# Patient Record
Sex: MALE | Race: WHITE | NOT HISPANIC OR LATINO | Employment: PART TIME | URBAN - METROPOLITAN AREA
[De-identification: names, ages, dates, MRNs, and addresses within clinical notes are randomized per-mention and may not be internally consistent; named-entity substitution may affect disease eponyms.]

---

## 2023-03-13 LAB
ALANINE AMINOTRANSFERASE (SGPT) (U/L) IN SER/PLAS: 18 U/L (ref 10–52)
ALBUMIN (G/DL) IN SER/PLAS: 4.4 G/DL (ref 3.4–5)
ALKALINE PHOSPHATASE (U/L) IN SER/PLAS: 74 U/L (ref 33–120)
ANION GAP IN SER/PLAS: 11 MMOL/L (ref 10–20)
ASPARTATE AMINOTRANSFERASE (SGOT) (U/L) IN SER/PLAS: 17 U/L (ref 9–39)
BILIRUBIN TOTAL (MG/DL) IN SER/PLAS: 0.6 MG/DL (ref 0–1.2)
CALCIDIOL (25 OH VITAMIN D3) (NG/ML) IN SER/PLAS: 68 NG/ML
CALCIUM (MG/DL) IN SER/PLAS: 9.5 MG/DL (ref 8.6–10.6)
CARBON DIOXIDE, TOTAL (MMOL/L) IN SER/PLAS: 29 MMOL/L (ref 21–32)
CHLORIDE (MMOL/L) IN SER/PLAS: 104 MMOL/L (ref 98–107)
CHOLESTEROL (MG/DL) IN SER/PLAS: 165 MG/DL (ref 0–199)
CHOLESTEROL IN HDL (MG/DL) IN SER/PLAS: 44.5 MG/DL
CHOLESTEROL/HDL RATIO: 3.7
CREATININE (MG/DL) IN SER/PLAS: 0.82 MG/DL (ref 0.5–1.3)
ERYTHROCYTE DISTRIBUTION WIDTH (RATIO) BY AUTOMATED COUNT: 12.2 % (ref 11.5–14.5)
ERYTHROCYTE MEAN CORPUSCULAR HEMOGLOBIN CONCENTRATION (G/DL) BY AUTOMATED: 32.7 G/DL (ref 32–36)
ERYTHROCYTE MEAN CORPUSCULAR VOLUME (FL) BY AUTOMATED COUNT: 85 FL (ref 80–100)
ERYTHROCYTES (10*6/UL) IN BLOOD BY AUTOMATED COUNT: 5.38 X10E12/L (ref 4.5–5.9)
FERRITIN (UG/LL) IN SER/PLAS: 142 UG/L (ref 20–300)
GFR MALE: >90 ML/MIN/1.73M2
GLUCOSE (MG/DL) IN SER/PLAS: 94 MG/DL (ref 74–99)
HEMATOCRIT (%) IN BLOOD BY AUTOMATED COUNT: 45.9 % (ref 41–52)
HEMOGLOBIN (G/DL) IN BLOOD: 15 G/DL (ref 13.5–17.5)
IRON (UG/DL) IN SER/PLAS: 84 UG/DL (ref 35–150)
IRON BINDING CAPACITY (UG/DL) IN SER/PLAS: 377 UG/DL (ref 240–445)
IRON SATURATION (%) IN SER/PLAS: 22 % (ref 25–45)
LDL: 92 MG/DL (ref 0–99)
LEUKOCYTES (10*3/UL) IN BLOOD BY AUTOMATED COUNT: 5.5 X10E9/L (ref 4.4–11.3)
NRBC (PER 100 WBCS) BY AUTOMATED COUNT: 0 /100 WBC (ref 0–0)
PLATELETS (10*3/UL) IN BLOOD AUTOMATED COUNT: 302 X10E9/L (ref 150–450)
POTASSIUM (MMOL/L) IN SER/PLAS: 4.3 MMOL/L (ref 3.5–5.3)
PROTEIN TOTAL: 6.9 G/DL (ref 6.4–8.2)
SODIUM (MMOL/L) IN SER/PLAS: 140 MMOL/L (ref 136–145)
THYROTROPIN (MIU/L) IN SER/PLAS BY DETECTION LIMIT <= 0.05 MIU/L: 1.6 MIU/L (ref 0.44–3.98)
TRIGLYCERIDE (MG/DL) IN SER/PLAS: 145 MG/DL (ref 0–149)
UREA NITROGEN (MG/DL) IN SER/PLAS: 10 MG/DL (ref 6–23)
VLDL: 29 MG/DL (ref 0–40)

## 2024-03-11 ENCOUNTER — OFFICE VISIT (OUTPATIENT)
Dept: PRIMARY CARE | Facility: CLINIC | Age: 29
End: 2024-03-11
Payer: COMMERCIAL

## 2024-03-11 VITALS
SYSTOLIC BLOOD PRESSURE: 126 MMHG | TEMPERATURE: 98.1 F | DIASTOLIC BLOOD PRESSURE: 85 MMHG | OXYGEN SATURATION: 96 % | WEIGHT: 200 LBS | HEIGHT: 70 IN | BODY MASS INDEX: 28.63 KG/M2 | HEART RATE: 82 BPM

## 2024-03-11 DIAGNOSIS — Z00.00 WELL ADULT EXAM: Primary | ICD-10-CM

## 2024-03-11 PROCEDURE — 1036F TOBACCO NON-USER: CPT | Performed by: INTERNAL MEDICINE

## 2024-03-11 PROCEDURE — 99395 PREV VISIT EST AGE 18-39: CPT | Performed by: INTERNAL MEDICINE

## 2024-03-11 RX ORDER — MULTIVITAMIN
TABLET ORAL
COMMUNITY

## 2024-03-11 RX ORDER — CETIRIZINE HYDROCHLORIDE 10 MG/1
10 TABLET ORAL DAILY
COMMUNITY

## 2024-03-11 ASSESSMENT — PATIENT HEALTH QUESTIONNAIRE - PHQ9
1. LITTLE INTEREST OR PLEASURE IN DOING THINGS: NOT AT ALL
SUM OF ALL RESPONSES TO PHQ9 QUESTIONS 1 & 2: 0
2. FEELING DOWN, DEPRESSED OR HOPELESS: NOT AT ALL

## 2024-03-11 NOTE — PROGRESS NOTES
"Subjective   Patient ID: Kal Esposito is a 28 y.o. male who presents for Annual Exam.    HPI   Here with dad  Overall well without concerns from patient or father  Diet is healthy.  Not much exercise. Walks on occasion.    Review of Systems    Objective   /85   Pulse 82   Temp 36.7 °C (98.1 °F)   Ht 1.778 m (5' 10\")   Wt 90.7 kg (200 lb)   SpO2 96%   BMI 28.70 kg/m²     Physical Exam  Constitutional:       Appearance: Normal appearance.   HENT:      Right Ear: Tympanic membrane and ear canal normal.      Left Ear: Tympanic membrane and ear canal normal.      Mouth/Throat:      Mouth: Mucous membranes are moist.   Eyes:      Extraocular Movements: Extraocular movements intact.      Pupils: Pupils are equal, round, and reactive to light.   Cardiovascular:      Rate and Rhythm: Normal rate and regular rhythm.      Heart sounds: No murmur heard.  Pulmonary:      Effort: Pulmonary effort is normal.      Breath sounds: Normal breath sounds.   Abdominal:      General: Bowel sounds are normal.      Palpations: Abdomen is soft.      Tenderness: There is no abdominal tenderness.   Musculoskeletal:         General: No deformity.      Cervical back: Neck supple.      Right lower leg: No edema.      Left lower leg: No edema.   Skin:     Findings: No lesion or rash.   Neurological:      Mental Status: He is alert.      Cranial Nerves: No cranial nerve deficit.      Motor: No weakness.      Gait: Gait normal.         Assessment/Plan   Problem List Items Addressed This Visit             ICD-10-CM    Well adult exam - Primary Z00.00    Relevant Orders    CBC    Comprehensive Metabolic Panel    Lipid Panel        Health maintenance:  -Labs ordered  -Discussed diet, exercise, and healthy lifestyle  -Tdap 2019     F/u 1 year cpe or prn  "

## 2024-04-19 ENCOUNTER — LAB (OUTPATIENT)
Dept: LAB | Facility: LAB | Age: 29
End: 2024-04-19
Payer: COMMERCIAL

## 2024-04-19 DIAGNOSIS — Z00.00 WELL ADULT EXAM: ICD-10-CM

## 2024-04-19 LAB
ALBUMIN SERPL BCP-MCNC: 4.5 G/DL (ref 3.4–5)
ALP SERPL-CCNC: 79 U/L (ref 33–120)
ALT SERPL W P-5'-P-CCNC: 34 U/L (ref 10–52)
ANION GAP SERPL CALC-SCNC: 13 MMOL/L (ref 10–20)
AST SERPL W P-5'-P-CCNC: 21 U/L (ref 9–39)
BILIRUB SERPL-MCNC: 0.6 MG/DL (ref 0–1.2)
BUN SERPL-MCNC: 12 MG/DL (ref 6–23)
CALCIUM SERPL-MCNC: 9.4 MG/DL (ref 8.6–10.6)
CHLORIDE SERPL-SCNC: 105 MMOL/L (ref 98–107)
CHOLEST SERPL-MCNC: 177 MG/DL (ref 0–199)
CHOLESTEROL/HDL RATIO: 3.6
CO2 SERPL-SCNC: 28 MMOL/L (ref 21–32)
CREAT SERPL-MCNC: 0.8 MG/DL (ref 0.5–1.3)
EGFRCR SERPLBLD CKD-EPI 2021: >90 ML/MIN/1.73M*2
ERYTHROCYTE [DISTWIDTH] IN BLOOD BY AUTOMATED COUNT: 12.7 % (ref 11.5–14.5)
GLUCOSE SERPL-MCNC: 89 MG/DL (ref 74–99)
HCT VFR BLD AUTO: 48.6 % (ref 41–52)
HDLC SERPL-MCNC: 49.6 MG/DL
HGB BLD-MCNC: 15.8 G/DL (ref 13.5–17.5)
LDLC SERPL CALC-MCNC: 100 MG/DL
MCH RBC QN AUTO: 28 PG (ref 26–34)
MCHC RBC AUTO-ENTMCNC: 32.5 G/DL (ref 32–36)
MCV RBC AUTO: 86 FL (ref 80–100)
NON HDL CHOLESTEROL: 127 MG/DL (ref 0–149)
NRBC BLD-RTO: 0 /100 WBCS (ref 0–0)
PLATELET # BLD AUTO: 322 X10*3/UL (ref 150–450)
POTASSIUM SERPL-SCNC: 4.3 MMOL/L (ref 3.5–5.3)
PROT SERPL-MCNC: 7.5 G/DL (ref 6.4–8.2)
RBC # BLD AUTO: 5.64 X10*6/UL (ref 4.5–5.9)
SODIUM SERPL-SCNC: 142 MMOL/L (ref 136–145)
TRIGL SERPL-MCNC: 136 MG/DL (ref 0–149)
VLDL: 27 MG/DL (ref 0–40)
WBC # BLD AUTO: 6.8 X10*3/UL (ref 4.4–11.3)

## 2024-04-19 PROCEDURE — 80053 COMPREHEN METABOLIC PANEL: CPT

## 2024-04-19 PROCEDURE — 36415 COLL VENOUS BLD VENIPUNCTURE: CPT

## 2024-04-19 PROCEDURE — 85027 COMPLETE CBC AUTOMATED: CPT

## 2024-04-19 PROCEDURE — 80061 LIPID PANEL: CPT

## 2024-05-07 ENCOUNTER — TELEPHONE (OUTPATIENT)
Dept: PRIMARY CARE | Facility: CLINIC | Age: 29
End: 2024-05-07
Payer: COMMERCIAL

## 2024-05-10 ENCOUNTER — TELEPHONE (OUTPATIENT)
Dept: PRIMARY CARE | Facility: CLINIC | Age: 29
End: 2024-05-10
Payer: COMMERCIAL

## 2024-05-13 ENCOUNTER — TELEPHONE (OUTPATIENT)
Dept: PRIMARY CARE | Facility: CLINIC | Age: 29
End: 2024-05-13
Payer: COMMERCIAL

## 2024-12-09 ENCOUNTER — OFFICE VISIT (OUTPATIENT)
Dept: PRIMARY CARE | Facility: CLINIC | Age: 29
End: 2024-12-09
Payer: COMMERCIAL

## 2024-12-09 VITALS
SYSTOLIC BLOOD PRESSURE: 137 MMHG | OXYGEN SATURATION: 95 % | BODY MASS INDEX: 29.2 KG/M2 | DIASTOLIC BLOOD PRESSURE: 86 MMHG | HEART RATE: 86 BPM | WEIGHT: 204 LBS | HEIGHT: 70 IN

## 2024-12-09 DIAGNOSIS — K59.09 CHRONIC CONSTIPATION: ICD-10-CM

## 2024-12-09 DIAGNOSIS — M25.50 ARTHRALGIA, UNSPECIFIED JOINT: Primary | ICD-10-CM

## 2024-12-09 PROCEDURE — 1036F TOBACCO NON-USER: CPT | Performed by: INTERNAL MEDICINE

## 2024-12-09 PROCEDURE — 99214 OFFICE O/P EST MOD 30 MIN: CPT | Performed by: INTERNAL MEDICINE

## 2024-12-09 PROCEDURE — 3008F BODY MASS INDEX DOCD: CPT | Performed by: INTERNAL MEDICINE

## 2024-12-09 RX ORDER — POLYETHYLENE GLYCOL 3350 17 G/17G
POWDER, FOR SOLUTION ORAL
COMMUNITY

## 2024-12-09 RX ORDER — FAMOTIDINE 10 MG/1
TABLET ORAL
COMMUNITY

## 2024-12-09 ASSESSMENT — PATIENT HEALTH QUESTIONNAIRE - PHQ9
2. FEELING DOWN, DEPRESSED OR HOPELESS: NOT AT ALL
1. LITTLE INTEREST OR PLEASURE IN DOING THINGS: NOT AT ALL
SUM OF ALL RESPONSES TO PHQ9 QUESTIONS 1 & 2: 0

## 2024-12-09 ASSESSMENT — ENCOUNTER SYMPTOMS: CONSTIPATION: 1

## 2024-12-09 NOTE — PROGRESS NOTES
"Subjective   Patient ID: Kal Esposito is a 29 y.o. male who presents for Joint Pain, Nausea (Started About 3 weeks ago pain got better last night), and Constipation.    Constipation       Here with father. Father gives most history.  Having joint pain since Nov 16.  Some nausea and constipation over same time frame.  Has had similar symptoms in the past, specifically during season changes.  Happened when he was 7-8 years old and maybe a few times since. Has been evaluated as a kid for this. Told possible HSP as a kid but never formally diagnosed.  Joint pain is migratory. Joints dont swell.  No vomiting.  Constipation was just one day but mom has been giving him miralax here and there. Bloating has improved since starting miralax. And bowels back to baseline.  No fevers.  Grandmother has PMR.  Has had chronic bowel issues since he was a kid. Chronic constipation as a kid, had been evaluated in the past and has had intermittent constipation as an adult.      Review of Systems   Gastrointestinal:  Positive for constipation.       Objective   /86 (BP Location: Right arm, Patient Position: Sitting)   Pulse 86   Ht 1.778 m (5' 10\")   Wt 92.5 kg (204 lb)   SpO2 95%   BMI 29.27 kg/m²     Physical Exam  Cardiovascular:      Rate and Rhythm: Normal rate and regular rhythm.      Heart sounds: No murmur heard.  Pulmonary:      Effort: Pulmonary effort is normal.      Breath sounds: Normal breath sounds.   Musculoskeletal:         General: No swelling or deformity.      Right lower leg: No edema.      Left lower leg: No edema.      Comments: Diffuse mild muscular and joint tenderness to palpation   Neurological:      General: No focal deficit present.      Gait: Gait normal.         Assessment/Plan   Problem List Items Addressed This Visit             ICD-10-CM    Joint pain - Primary M25.50    Relevant Orders    TSH with reflex to Free T4 if abnormal    CBC    Comprehensive Metabolic Panel    Sedimentation Rate    " C-reactive protein    KETAN with Reflex to VEE    Rheumatoid factor    Citrulline Antibody, IgG    Referral to Rheumatology    Chronic constipation K59.09    Relevant Orders    Urinalysis with Reflex Microscopic          Polyarthralgias - will start with labs. Family would like to see Rheum also so he was referred.  Chronic constipation - Appears at baseline. Will check labs.

## 2024-12-10 ENCOUNTER — LAB (OUTPATIENT)
Dept: LAB | Facility: LAB | Age: 29
End: 2024-12-10
Payer: COMMERCIAL

## 2024-12-10 DIAGNOSIS — K59.09 CHRONIC CONSTIPATION: ICD-10-CM

## 2024-12-10 DIAGNOSIS — M25.50 ARTHRALGIA, UNSPECIFIED JOINT: ICD-10-CM

## 2024-12-10 PROCEDURE — 85027 COMPLETE CBC AUTOMATED: CPT

## 2024-12-10 PROCEDURE — 36415 COLL VENOUS BLD VENIPUNCTURE: CPT

## 2024-12-10 PROCEDURE — 86038 ANTINUCLEAR ANTIBODIES: CPT

## 2024-12-10 PROCEDURE — 80053 COMPREHEN METABOLIC PANEL: CPT

## 2024-12-10 PROCEDURE — 86140 C-REACTIVE PROTEIN: CPT

## 2024-12-10 PROCEDURE — 86431 RHEUMATOID FACTOR QUANT: CPT

## 2024-12-10 PROCEDURE — 84443 ASSAY THYROID STIM HORMONE: CPT

## 2024-12-10 PROCEDURE — 81003 URINALYSIS AUTO W/O SCOPE: CPT

## 2024-12-10 PROCEDURE — 86200 CCP ANTIBODY: CPT

## 2024-12-10 PROCEDURE — 85652 RBC SED RATE AUTOMATED: CPT

## 2024-12-11 LAB
ALBUMIN SERPL BCP-MCNC: 4.6 G/DL (ref 3.4–5)
ALP SERPL-CCNC: 77 U/L (ref 33–120)
ALT SERPL W P-5'-P-CCNC: 24 U/L (ref 10–52)
ANA SER QL HEP2 SUBST: NEGATIVE
ANION GAP SERPL CALC-SCNC: 12 MMOL/L (ref 10–20)
APPEARANCE UR: CLEAR
AST SERPL W P-5'-P-CCNC: 16 U/L (ref 9–39)
BILIRUB SERPL-MCNC: 0.5 MG/DL (ref 0–1.2)
BILIRUB UR STRIP.AUTO-MCNC: NEGATIVE MG/DL
BUN SERPL-MCNC: 10 MG/DL (ref 6–23)
CALCIUM SERPL-MCNC: 9.7 MG/DL (ref 8.6–10.6)
CCP IGG SERPL-ACNC: <1 U/ML
CHLORIDE SERPL-SCNC: 102 MMOL/L (ref 98–107)
CO2 SERPL-SCNC: 31 MMOL/L (ref 21–32)
COLOR UR: COLORLESS
CREAT SERPL-MCNC: 0.86 MG/DL (ref 0.5–1.3)
CRP SERPL-MCNC: 0.21 MG/DL
EGFRCR SERPLBLD CKD-EPI 2021: >90 ML/MIN/1.73M*2
ERYTHROCYTE [DISTWIDTH] IN BLOOD BY AUTOMATED COUNT: 11.9 % (ref 11.5–14.5)
ERYTHROCYTE [SEDIMENTATION RATE] IN BLOOD BY WESTERGREN METHOD: 2 MM/H (ref 0–15)
GLUCOSE SERPL-MCNC: 89 MG/DL (ref 74–99)
GLUCOSE UR STRIP.AUTO-MCNC: NORMAL MG/DL
HCT VFR BLD AUTO: 46 % (ref 41–52)
HGB BLD-MCNC: 15.3 G/DL (ref 13.5–17.5)
KETONES UR STRIP.AUTO-MCNC: NEGATIVE MG/DL
LEUKOCYTE ESTERASE UR QL STRIP.AUTO: NEGATIVE
MCH RBC QN AUTO: 27.9 PG (ref 26–34)
MCHC RBC AUTO-ENTMCNC: 33.3 G/DL (ref 32–36)
MCV RBC AUTO: 84 FL (ref 80–100)
NITRITE UR QL STRIP.AUTO: NEGATIVE
NRBC BLD-RTO: 0 /100 WBCS (ref 0–0)
PH UR STRIP.AUTO: 6 [PH]
PLATELET # BLD AUTO: 326 X10*3/UL (ref 150–450)
POTASSIUM SERPL-SCNC: 3.9 MMOL/L (ref 3.5–5.3)
PROT SERPL-MCNC: 7.5 G/DL (ref 6.4–8.2)
PROT UR STRIP.AUTO-MCNC: NEGATIVE MG/DL
RBC # BLD AUTO: 5.48 X10*6/UL (ref 4.5–5.9)
RBC # UR STRIP.AUTO: NEGATIVE /UL
RHEUMATOID FACT SER NEPH-ACNC: <10 IU/ML (ref 0–15)
SODIUM SERPL-SCNC: 141 MMOL/L (ref 136–145)
SP GR UR STRIP.AUTO: 1.01
TSH SERPL-ACNC: 1.04 MIU/L (ref 0.44–3.98)
UROBILINOGEN UR STRIP.AUTO-MCNC: NORMAL MG/DL
WBC # BLD AUTO: 7.2 X10*3/UL (ref 4.4–11.3)

## 2024-12-16 ENCOUNTER — TELEPHONE (OUTPATIENT)
Dept: PRIMARY CARE | Facility: CLINIC | Age: 29
End: 2024-12-16
Payer: COMMERCIAL

## 2024-12-16 NOTE — TELEPHONE ENCOUNTER
----- Message from Omi Navarro sent at 12/15/2024  8:40 AM EST -----  Blood work all normal. Urine normal also.

## 2024-12-17 ENCOUNTER — TELEPHONE (OUTPATIENT)
Dept: PRIMARY CARE | Facility: CLINIC | Age: 29
End: 2024-12-17
Payer: COMMERCIAL

## 2024-12-19 ENCOUNTER — TELEPHONE (OUTPATIENT)
Dept: PRIMARY CARE | Facility: CLINIC | Age: 29
End: 2024-12-19
Payer: COMMERCIAL

## 2024-12-19 NOTE — TELEPHONE ENCOUNTER
Spoke to patient to give results : per Dr Navarro Blood work all normal. Urine normal also.     Patient understood

## 2025-03-17 ENCOUNTER — APPOINTMENT (OUTPATIENT)
Dept: PRIMARY CARE | Facility: CLINIC | Age: 30
End: 2025-03-17
Payer: COMMERCIAL

## 2025-04-25 ENCOUNTER — APPOINTMENT (OUTPATIENT)
Dept: PRIMARY CARE | Facility: CLINIC | Age: 30
End: 2025-04-25
Payer: COMMERCIAL

## 2025-04-25 VITALS
OXYGEN SATURATION: 95 % | HEIGHT: 70 IN | HEART RATE: 82 BPM | SYSTOLIC BLOOD PRESSURE: 124 MMHG | WEIGHT: 200 LBS | BODY MASS INDEX: 28.63 KG/M2 | DIASTOLIC BLOOD PRESSURE: 86 MMHG

## 2025-04-25 DIAGNOSIS — Z00.00 WELL ADULT EXAM: Primary | ICD-10-CM

## 2025-04-25 DIAGNOSIS — J30.1 SEASONAL ALLERGIC RHINITIS DUE TO POLLEN: ICD-10-CM

## 2025-04-25 DIAGNOSIS — Z13.6 SCREENING FOR CARDIOVASCULAR CONDITION: ICD-10-CM

## 2025-04-25 PROCEDURE — 99395 PREV VISIT EST AGE 18-39: CPT | Performed by: INTERNAL MEDICINE

## 2025-04-25 PROCEDURE — 3008F BODY MASS INDEX DOCD: CPT | Performed by: INTERNAL MEDICINE

## 2025-04-25 PROCEDURE — 1036F TOBACCO NON-USER: CPT | Performed by: INTERNAL MEDICINE

## 2025-04-25 RX ORDER — LORATADINE 10 MG/1
10 TABLET ORAL DAILY
COMMUNITY

## 2025-04-25 ASSESSMENT — PATIENT HEALTH QUESTIONNAIRE - PHQ9
1. LITTLE INTEREST OR PLEASURE IN DOING THINGS: NOT AT ALL
2. FEELING DOWN, DEPRESSED OR HOPELESS: NOT AT ALL
SUM OF ALL RESPONSES TO PHQ9 QUESTIONS 1 & 2: 0

## 2025-04-25 NOTE — PROGRESS NOTES
"Subjective   Patient ID: Kal Esposito is a 29 y.o. male who presents for Annual Exam.    HPI   Here with dad  Working at [employer], active there. No other exercise.  Diet is healthy but snacks too much.    Deals with a lot of chronic sinus congestion and season allergies.  Taking claritin daily  Was using flonase but has recently stopped  Uses saline nasal spray    Chronic abd issues at baseline. Rare dyspepsia which he takes pepcid but this is very infrequent, notices more when he is constipated. Uses miralax prn for constipation.    Review of Systems    Objective   /86 (BP Location: Right arm, Patient Position: Sitting)   Pulse 82   Ht 1.778 m (5' 10\")   Wt 90.7 kg (200 lb)   SpO2 95%   BMI 28.70 kg/m²     Physical Exam  Constitutional:       Appearance: Normal appearance.   HENT:      Right Ear: Tympanic membrane and ear canal normal.      Left Ear: Tympanic membrane and ear canal normal.      Mouth/Throat:      Mouth: Mucous membranes are moist.   Eyes:      Extraocular Movements: Extraocular movements intact.      Pupils: Pupils are equal, round, and reactive to light.   Cardiovascular:      Rate and Rhythm: Normal rate and regular rhythm.      Heart sounds: No murmur heard.  Pulmonary:      Effort: Pulmonary effort is normal.      Breath sounds: Normal breath sounds.   Abdominal:      General: Bowel sounds are normal.      Palpations: Abdomen is soft.      Tenderness: There is no abdominal tenderness.   Musculoskeletal:         General: No deformity.      Cervical back: Neck supple.      Right lower leg: No edema.      Left lower leg: No edema.   Skin:     Findings: No lesion or rash.   Neurological:      Mental Status: He is alert.      Cranial Nerves: No cranial nerve deficit.      Motor: No weakness.      Gait: Gait normal.         Assessment/Plan   Problem List Items Addressed This Visit           ICD-10-CM    Well adult exam - Primary Z00.00    Seasonal allergic rhinitis due to pollen J30.1    " Relevant Orders    Referral to Allergy     Other Visit Diagnoses         Codes      Screening for cardiovascular condition     Z13.6    Relevant Orders    Lipid Panel               Chronic constipation - Appears at baseline. Miralax prn.    Allergic rhinitis    Elevated BP - Discussed monitoring at home, call if running high. Low salt diet, increase exercise.    Health maintenance:  -Labs ordered  -Discussed diet, exercise, and healthy lifestyle  -Tdap 2019     F/u 1 year cpe or prn

## 2025-05-27 ENCOUNTER — APPOINTMENT (OUTPATIENT)
Dept: DERMATOLOGY | Facility: CLINIC | Age: 30
End: 2025-05-27
Payer: COMMERCIAL

## 2026-04-27 ENCOUNTER — APPOINTMENT (OUTPATIENT)
Dept: PRIMARY CARE | Facility: CLINIC | Age: 31
End: 2026-04-27
Payer: COMMERCIAL